# Patient Record
(demographics unavailable — no encounter records)

---

## 2025-01-23 NOTE — PHYSICAL EXAM
[Alert] : alert [No Acute Distress] : no acute distress [Normocephalic] : normocephalic [Anterior [place] Closed] : anterior fontanelle closed [Red Reflex Bilateral] : red reflex bilateral [PERRL] : PERRL [Normally Placed Ears] : normally placed ears [Auricles Well Formed] : auricles well formed [Clear Tympanic membranes with present light reflex and bony landmarks] : clear tympanic membranes with present light reflex and bony landmarks [No Discharge] : no discharge [Nares Patent] : nares patent [Palate Intact] : palate intact [Uvula Midline] : uvula midline [Tooth Eruption] : tooth eruption  [Supple, full passive range of motion] : supple, full passive range of motion [No Palpable Masses] : no palpable masses [Symmetric Chest Rise] : symmetric chest rise [Clear to Auscultation Bilaterally] : clear to auscultation bilaterally [Regular Rate and Rhythm] : regular rate and rhythm [S1, S2 present] : S1, S2 present [No Murmurs] : no murmurs [+2 Femoral Pulses] : +2 femoral pulses [Soft] : soft [NonTender] : non tender [Non Distended] : non distended [Normoactive Bowel Sounds] : normoactive bowel sounds [No Hepatomegaly] : no hepatomegaly [No Splenomegaly] : no splenomegaly [Siddharth 1] : Siddharth 1 [No Clitoromegaly] : no clitoromegaly [Normal Vaginal Introitus] : normal vaginal introitus [Patent] : patent [Normally Placed] : normally placed [No Abnormal Lymph Nodes Palpated] : no abnormal lymph nodes palpated [No Clavicular Crepitus] : no clavicular crepitus [Negative Casas-Ortalani] : negative Casas-Ortalani [Symmetric Buttocks Creases] : symmetric buttocks creases [No Spinal Dimple] : no spinal dimple [NoTuft of Hair] : no tuft of hair [Cranial Nerves Grossly Intact] : cranial nerves grossly intact [FreeTextEntry1] : good interaction [FreeTextEntry5] : RR++ LR= [FreeTextEntry8] : nl [FreeTextEntry9] : nl [FreeTextEntry6] : nl [de-identified] : nl [de-identified] : mild dry cheeks.

## 2025-01-23 NOTE — DISCUSSION/SUMMARY
[Normal Growth] : growth [Normal Development] : development [None] : No known medical problems [No Elimination Concerns] : elimination [No Feeding Concerns] : feeding [No Skin Concerns] : skin [Normal Sleep Pattern] : sleep [No Medications] : ~He/She~ is not on any medications [Parent/Guardian] : parent/guardian [] : The components of the vaccine(s) to be administered today are listed in the plan of care. The disease(s) for which the vaccine(s) are intended to prevent and the risks have been discussed with the caretaker.  The risks are also included in the appropriate vaccination information statements which have been provided to the patient's caregiver.  The caregiver has given consent to vaccinate. [FreeTextEntry1] : WEll child  Overwt - reviewed healthy diet and exercise.   Pentacel Prevnar given  Safety, antic guidance in detail. Childproofing, put cleaning liquids and laundry pods up high, Do not rely on child proof locks, put dangerous products out of reach.  Keep all medicines and vitamins where children cannot reach them.  Do not put cleaning liquids in drink bottles. Choking prevention in detail, no hot dogs, whole nuts, popcorn  Mash round fruits and vegs and other foods. Take CPR class.  CS or booster seat use. Car seat in back seat facing backwards until age 2 yrs. Tap water for fluoride.  No  food or drink after brush teeth each night. Safe crib, remove mobiles etc. Have smoke detectors and carbon monoxide detectors in the home and check them and change batteries regularly. Fire extinguisher in the kitchen. Healthy eating and exercise.  Family meals make happier kids.  5234 healthy eating advised. Pool, water safety.  SD CO FE

## 2025-01-23 NOTE — DISCUSSION/SUMMARY
[Normal Growth] : growth [Normal Development] : development [None] : No known medical problems [No Elimination Concerns] : elimination [No Feeding Concerns] : feeding [No Skin Concerns] : skin [Normal Sleep Pattern] : sleep [No Medications] : ~He/She~ is not on any medications [Parent/Guardian] : parent/guardian [] : The components of the vaccine(s) to be administered today are listed in the plan of care. The disease(s) for which the vaccine(s) are intended to prevent and the risks have been discussed with the caretaker.  The risks are also included in the appropriate vaccination information statements which have been provided to the patient's caregiver.  The caregiver has given consent to vaccinate. [FreeTextEntry1] : WEll child  Overwt - reviewed healthy diet and exercise.   Pentacel Prevnar given  Safety, antic guidance in detail. Childproofing, put cleaning liquids and laundry pods up high, Do not rely on child proof locks, put dangerous products out of reach.  Keep all medicines and vitamins where children cannot reach them.  Do not put cleaning liquids in drink bottles. Choking prevention in detail, no hot dogs, whole nuts, popcorn  Mash round fruits and vegs and other foods. Take CPR class.  CS or booster seat use. Car seat in back seat facing backwards until age 2 yrs. Tap water for fluoride.  No  food or drink after brush teeth each night. Safe crib, remove mobiles etc. Have smoke detectors and carbon monoxide detectors in the home and check them and change batteries regularly. Fire extinguisher in the kitchen. Healthy eating and exercise.  Family meals make happier kids.  5293 healthy eating advised. Pool, water safety.  SD CO FE

## 2025-01-23 NOTE — PHYSICAL EXAM
[Alert] : alert [No Acute Distress] : no acute distress [Normocephalic] : normocephalic [Anterior [place] Closed] : anterior fontanelle closed [Red Reflex Bilateral] : red reflex bilateral [PERRL] : PERRL [Normally Placed Ears] : normally placed ears [Auricles Well Formed] : auricles well formed [Clear Tympanic membranes with present light reflex and bony landmarks] : clear tympanic membranes with present light reflex and bony landmarks [No Discharge] : no discharge [Nares Patent] : nares patent [Palate Intact] : palate intact [Uvula Midline] : uvula midline [Tooth Eruption] : tooth eruption  [Supple, full passive range of motion] : supple, full passive range of motion [No Palpable Masses] : no palpable masses [Symmetric Chest Rise] : symmetric chest rise [Clear to Auscultation Bilaterally] : clear to auscultation bilaterally [Regular Rate and Rhythm] : regular rate and rhythm [S1, S2 present] : S1, S2 present [No Murmurs] : no murmurs [+2 Femoral Pulses] : +2 femoral pulses [Soft] : soft [NonTender] : non tender [Non Distended] : non distended [Normoactive Bowel Sounds] : normoactive bowel sounds [No Hepatomegaly] : no hepatomegaly [No Splenomegaly] : no splenomegaly [Siddharth 1] : Siddharth 1 [No Clitoromegaly] : no clitoromegaly [Normal Vaginal Introitus] : normal vaginal introitus [Patent] : patent [Normally Placed] : normally placed [No Abnormal Lymph Nodes Palpated] : no abnormal lymph nodes palpated [No Clavicular Crepitus] : no clavicular crepitus [Negative Casas-Ortalani] : negative Casas-Ortalani [Symmetric Buttocks Creases] : symmetric buttocks creases [No Spinal Dimple] : no spinal dimple [NoTuft of Hair] : no tuft of hair [Cranial Nerves Grossly Intact] : cranial nerves grossly intact [FreeTextEntry1] : good interaction [FreeTextEntry5] : RR++ LR= [FreeTextEntry8] : nl [FreeTextEntry9] : nl [FreeTextEntry6] : nl [de-identified] : nl [de-identified] : mild dry cheeks.

## 2025-01-23 NOTE — HISTORY OF PRESENT ILLNESS
[Parents] : parents [Normal] : Normal [No] : No cigarette smoke exposure [Water heater temperature set at <120 degrees F] : Water heater temperature set at <120 degrees F [Car seat in back seat] : Car seat in back seat [Carbon Monoxide Detectors] : Carbon monoxide detectors [Smoke Detectors] : Smoke detectors [FreeTextEntry1] : Parents 16 mos old. Hears babbles has words.  Interacts well, good eye contact. Points. Responds to name.  Plays, smiles , laughs, social interaction nl. Walks runs climbs.   Eats well Sleeps well  Gets multivits with iron daily, stored safely. Has derm appt next week - rough patches on cheeks, and roughness on chest. Rash on arms cleared up with cream from derm.  No strab noted.   Diet reviewed.  21 oz milk.  NKA

## 2025-02-03 NOTE — CONSULT LETTER
[Dear  ___] : Dear  [unfilled], [Consult Letter:] : I had the pleasure of evaluating your patient, [unfilled]. [Please see my note below.] : Please see my note below. [Consult Closing:] : Thank you very much for allowing me to participate in the care of this patient.  If you have any questions, please do not hesitate to contact me. [Sincerely,] : Sincerely, [FreeTextEntry3] : Mirtha Sharma MD - [facility]

## 2025-02-03 NOTE — HISTORY OF PRESENT ILLNESS
[FreeTextEntry1] : f/u rash [de-identified] : Ms. JOB MACHADO is a 16 month old F is presenting for f/u rash, initial rash improved, mom concerned about use of mometasone now some new rough patches with itch on cheeks, tried oatmeal bath  Soap:  Dove baby sensitive M: Coconut Oil  Detergent:  Free and Clear Dryer Sheets:  No

## 2025-02-03 NOTE — PHYSICAL EXAM
[Alert] : alert [Well Nourished] : well nourished [Conjunctiva Non-injected] : conjunctiva non-injected [No Visual Lymphadenopathy] : no visual  lymphadenopathy [No Clubbing] : no clubbing [No Edema] : no edema [No Bromhidrosis] : no bromhidrosis [No Chromhidrosis] : no chromhidrosis [FreeTextEntry3] : few rough patches on cheeks and dryness

## 2025-02-03 NOTE — ASSESSMENT
[FreeTextEntry1] : #Eczematous dermatitis- markedly improved, mom nervous about steroid use and has avoided use since initial presentation - Orientation provided about nature of condition, treatment expectations, alternatives, risks and benefits - Avoid products with fragrance, wipes, eye makeup. Advised switching to Vanicream brand products - START Desonide ointment to affected areas PRN for roughness, avoid eyes, weaker steroid than previously given - Start Eucrisa as nonsteroidal option, SED - Dry/gentle skin care reviewed  Dry skin care reviewed:   - Take short showers/baths (avoid hot water)   - Use a mild soap (eg. CeraVe cleanser or Aquaphor)   - Use soap only on areas truly needed (underarms,groin,buttocks,fold areas, feet, face, hair)   - Pat off excess water and put moisturizer on immediately (within 3 min.)               Good moisturizing choices include:                        1. Cetaphil cream (not baby Cetaphil)                        2. CeraVe cream                        3. Vanicream cream                        4. Aquaphor ointment                        5. Vaseline ointment                        6. CeraVe ointment   - A moisturizer should always be applied after showering or bathing, but may be applied as many additional times as is necessary.  #Xerosis - principles of dry skin care extensively reviewed including the importance of using an emollient at least once a day and avoiding fragranced products including soap and detergent  f/u PRN or if any new or changing concerning lesions/rash

## 2025-02-19 NOTE — HISTORY OF PRESENT ILLNESS
[de-identified] : not walking [FreeTextEntry6] :  Parent requested telephone visit.  Parent does not want video visit at this time.  Verbal consent was obtained from the parent for this visit. Patient location [facility]. MD location:  medical office, [facility].   Spoke to mother  Pt good development.  Almost 17 mos. Did take 2 steps, now will not walk.  Does pull to stand, can stand 10 mins. Crawls very fast. Seems to hav no pain anywhere.  Now she sits on her knees and crawls on her knees.  Not a breech. No FH hip dislocation.   Disc in detail. Sounds as if afraid to walk not as if can't physically.  Advised how to try to get her to walk. One adult holds her trunk and other one holds a toy at shoulder level so can only reach by taking a step.  If won't walk still have a month where it is nl RTO for exam to be sure all OK. - if wont walk next few days come in next week for check of hips, legs.  Start EI evaln [facility] if wont take steps with support.  Chart reviewed, child referred for hip sono for asymmetric creases age 2 mos, did not go.  21 mins  22 mins

## 2025-04-24 NOTE — HISTORY OF PRESENT ILLNESS
[Parents] : parents [Normal] : Normal [No] : No cigarette smoke exposure [Water heater temperature set at <120 degrees F] : Water heater temperature set at <120 degrees F [Car seat in back seat] : Car seat in back seat [Carbon Monoxide Detectors] : Carbon monoxide detectors [Smoke Detectors] : Smoke detectors [FreeTextEntry1] : 19 mos old. Walks well, runs, climbs.  Hears, has many words, has 2 word sentences. Very interactive and vernal.  Understands and follows commands.  Interacts well, social nl. Makes good eye contact. Points. Responds to name.  Imaginative play.   Eats well.  Sleeps well.  Had 1 day fever 5 d ago, well since.   skin issues now resolved, no derm FU needed, on no meds or topicals now.

## 2025-04-24 NOTE — PHYSICAL EXAM
[Alert] : alert [No Acute Distress] : no acute distress [Normocephalic] : normocephalic [Anterior [place] Closed] : anterior fontanelle closed [Red Reflex Bilateral] : red reflex bilateral [PERRL] : PERRL [Normally Placed Ears] : normally placed ears [Auricles Well Formed] : auricles well formed [Clear Tympanic membranes with present light reflex and bony landmarks] : clear tympanic membranes with present light reflex and bony landmarks [No Discharge] : no discharge [Nares Patent] : nares patent [Palate Intact] : palate intact [Uvula Midline] : uvula midline [Tooth Eruption] : tooth eruption  [Supple, full passive range of motion] : supple, full passive range of motion [No Palpable Masses] : no palpable masses [Symmetric Chest Rise] : symmetric chest rise [Clear to Auscultation Bilaterally] : clear to auscultation bilaterally [Regular Rate and Rhythm] : regular rate and rhythm [S1, S2 present] : S1, S2 present [No Murmurs] : no murmurs [+2 Femoral Pulses] : +2 femoral pulses [Soft] : soft [NonTender] : non tender [Non Distended] : non distended [Normoactive Bowel Sounds] : normoactive bowel sounds [No Hepatomegaly] : no hepatomegaly [No Splenomegaly] : no splenomegaly [Siddharth 1] : Siddharth 1 [No Clitoromegaly] : no clitoromegaly [Normal Vaginal Introitus] : normal vaginal introitus [Patent] : patent [Normally Placed] : normally placed [No Abnormal Lymph Nodes Palpated] : no abnormal lymph nodes palpated [No Clavicular Crepitus] : no clavicular crepitus [Symmetric Buttocks Creases] : symmetric buttocks creases [No Spinal Dimple] : no spinal dimple [NoTuft of Hair] : no tuft of hair [Cranial Nerves Grossly Intact] : cranial nerves grossly intact [No Rash or Lesions] : no rash or lesions [FreeTextEntry1] : good eye contact and social interaction [FreeTextEntry5] : RR++ LR= [de-identified] : nl

## 2025-04-24 NOTE — DISCUSSION/SUMMARY
[Normal Development] : development [None] : No known medical problems [No Elimination Concerns] : elimination [No Feeding Concerns] : feeding [No Skin Concerns] : skin [Normal Sleep Pattern] : sleep [No Medications] : ~He/She~ is not on any medications [Parent/Guardian] : parent/guardian [] : The components of the vaccine(s) to be administered today are listed in the plan of care. The disease(s) for which the vaccine(s) are intended to prevent and the risks have been discussed with the caretaker.  The risks are also included in the appropriate vaccination information statements which have been provided to the patient's caregiver.  The caregiver has given consent to vaccinate. [FreeTextEntry1] : Well 19 mos old, development great. Overweight, healthy diet and exercise reviewed.   Hep A given.   RTO age 2 yrs old.   Safety, antic guidance in detail. Childproofing, put cleaning liquids and laundry pods up high, Do not rely on child proof locks, put dangerous products out of reach.  Keep all medicines and vitamins where children cannot reach them.  Do not put cleaning liquids in drink bottles. Choking prevention in detail, no hot dogs, whole nuts, popcorn  Mash round fruits and vegs and other foods. Take CPR class.  CS or booster seat use. Car seat in back seat facing backwards until age 2 yrs. Tap water for fluoride.  No  food or drink after brush teeth each night. Safe crib, remove mobiles etc. Have smoke detectors and carbon monoxide detectors in the home and check them and change batteries regularly. Fire extinguisher in the kitchen. Healthy eating and exercise.  Family meals make happier kids.  5210 healthy eating advised. Pool, water safety.  SD CO FE